# Patient Record
Sex: MALE | Race: WHITE
[De-identification: names, ages, dates, MRNs, and addresses within clinical notes are randomized per-mention and may not be internally consistent; named-entity substitution may affect disease eponyms.]

---

## 2019-02-21 ENCOUNTER — HOSPITAL ENCOUNTER (EMERGENCY)
Dept: HOSPITAL 25 - UCEAST | Age: 64
Discharge: HOME | End: 2019-02-21
Payer: COMMERCIAL

## 2019-02-21 VITALS — DIASTOLIC BLOOD PRESSURE: 93 MMHG | SYSTOLIC BLOOD PRESSURE: 178 MMHG

## 2019-02-21 DIAGNOSIS — H65.03: ICD-10-CM

## 2019-02-21 DIAGNOSIS — I10: ICD-10-CM

## 2019-02-21 DIAGNOSIS — R42: Primary | ICD-10-CM

## 2019-02-21 DIAGNOSIS — Z87.891: ICD-10-CM

## 2019-02-21 PROCEDURE — 93005 ELECTROCARDIOGRAM TRACING: CPT

## 2019-02-21 PROCEDURE — G0463 HOSPITAL OUTPT CLINIC VISIT: HCPCS

## 2019-02-21 PROCEDURE — 99202 OFFICE O/P NEW SF 15 MIN: CPT

## 2019-02-21 NOTE — UC
Dizzy HPI


HPI Summary: 


63-year-old male comes in with a chief complaint of dizziness.  Last night he 

was watching TV and he started feeling dizzy.  It's worse when he walks and 

moves around and when he turns his head.  Started describing any spinning but 

floor does seem to move a little bit as he walks.  He's also been having a 

buzzing and is ears for last several days.  He reports sinus congestion has 

been using Afrin every night for for about 7 days.  No fevers or chills.  He 

has been getting some rhinorrhea primarily in the morning.  No chest pain no 

shortness of breath.  No change in vision or speech or any focal weakness or 

numbness.





- History Of Current Complaint


Chief Complaint: UCDizziness


Stated Complaint: DIZZINESS


Time Seen by Provider: 02/21/19 08:21


Pain Intensity: 0





- Allergies/Home Medications


Allergies/Adverse Reactions: 


 Allergies











Allergy/AdvReac Type Severity Reaction Status Date / Time


 


No Known Allergies Allergy   Verified 02/21/19 08:06














PMH/Surg Hx/FS Hx/Imm Hx


Previously Healthy: Yes - sarcoid


Cardiovascular History: Hypertension





- Surgical History


Surgical History: Yes


Surgery Procedure, Year, and Place: VASCECTOMY, BACK SURGERY-DISC 2008,eye 

SURGERY 15 YEARS AGO





- Family History


Known Family History: Positive: None - denies htn, cad.





- Social History


Alcohol Use: Daily


Alcohol Amount: 3 mark anthony/beer


Substance Use Type: None


Smoking Status (MU): Former Smoker


When Did the Patient Quit Smoking/Using Tobacco: 15 years ago





- Immunization History


Most Recent Influenza Vaccination: 10/16





Review of Systems


All Other Systems Reviewed And Are Negative: Yes


Constitutional: Positive: Negative


Skin: Positive: Negative


Eyes: Positive: Negative


ENT: Positive: Sinus Congestion


Respiratory: Positive: Negative


Cardiovascular: Positive: Negative


Gastrointestinal: Positive: Negative


Motor: Positive: Negative


Neurovascular: Positive: Negative


Musculoskeletal: Positive: Negative


Neurological: Positive: Other - buzzing in head


Psychological: Positive: Negative


Is Patient Immunocompromised?: No





Physical Exam


Triage Information Reviewed: Yes


Appearance: Well-Appearing, No Pain Distress, Well-Nourished


Vital Signs: 


 Initial Vital Signs











Temp  97.3 F   02/21/19 08:03


 


Pulse  82   02/21/19 08:03


 


Resp  20   02/21/19 08:03


 


BP  178/93   02/21/19 08:03


 


Pulse Ox  100   02/21/19 08:03











Vital Signs Reviewed: Yes


Eye Exam: Normal


Eyes: Positive: Conjunctiva Clear, Other: - PERRLA/EOMI   NO NYSTAGMUS SEEN


ENT: Positive: Pharynx normal, TM bulging - B/L, TM dull - B/L.  Negative: TM 

red


Neck exam: Normal


Respiratory: Positive: Lungs clear, Normal breath sounds, No respiratory 

distress


Cardiovascular: Positive: RRR


Musculoskeletal Exam: Normal


Musculoskeletal: Positive: Strength Intact, ROM Intact


Neurological Exam: Normal


Neurological: Positive: Alert, Muscle Tone Normal, Other: - NORMAL FINGER TO 

NOSE AND HEEL TO SHIN


Psychological Exam: Normal


Psychological: Positive: Age Appropriate Behavior


Skin Exam: Normal





Diagnostics





- EKG


Cardiac Rate: NL - at 0849


Cardiac Rhythm: Sinus: Normal - 75bpm


Ectopy: None - prolonged ND interval 238


ST Segment: Normal





Dizzy Course/Dx





- Course


Course Of Treatment: Patient's blood pressure decreased while in clinic.  His 

dizziness symptoms also improved while in clinic after taking the meclizine.  

Overall most likely cause of the dizziness is peripheral vertigo as it appears 

that he has some fluid behind both ears he's been having a ringing in his ears 

and he's had congestion for about a week.  He's also been using Afrin every 

evening for about a week.  He may be having rebound effects from the Afrin.  On 

examination I found no focal neurologic deficit.  I did not see any nystagmus 

on exam.  Finger to nose and heel to shin were both normal.  Initially the 

patient had a slightly wide-based gait but as his dizziness improved that 

normalized.  We discussed the possibility of a stroke as a cause of dizziness.  

In his case it's quite unlikely to be the cause.  However I let him know that 

if the medication was not working or if he worsened or if his symptoms 

persisted that he should go the emergency department for further evaluation.  

He agreed.





- Differential Dx/Diagnosis


Provider Diagnosis: 


 Dizziness, Acute serous otitis media of both ears, Hypertension








Discharge





- Sign-Out/Discharge


Documenting (check all that apply): Patient Departure


All imaging exams completed and their final reports reviewed: No Studies





- Discharge Plan


Condition: Stable


Disposition: HOME


Prescriptions: 


Amoxicillin/Clavulanate TAB* [Augmentin *] 875 mg PO BID #20 tab


Meclizine HCl [Motion Sickness Relief] 25 mg PO Q6H #20 tablet


Patient Education Materials:  Hypertension (ED), Dizziness (ED), Serous Otitis 

Media (ED)


Referrals: 


Kyler Rodrigues MD [Primary Care Provider] - 


Additional Instructions: 


FOLLOW UP WITH YOUR DOCTOR IF NOT COMPLETELY IMPROVED.


GO TO THE EMERGENCY DEPARTMENT FOR ANY WORSENING OF YOUR CONDITION; CONTINUED/

WORSENING OF DIZZINESS, DIFFICULTY WITH VISION OR SPEECH, WEAKNESS, NUMBNESS, 

YOU FEEL ILL OR QUESTIONS OR CONCERNS.





- Billing Disposition and Condition


Condition: STABLE


Disposition: Home